# Patient Record
Sex: MALE | Race: WHITE
[De-identification: names, ages, dates, MRNs, and addresses within clinical notes are randomized per-mention and may not be internally consistent; named-entity substitution may affect disease eponyms.]

---

## 2022-05-28 ENCOUNTER — HOSPITAL ENCOUNTER (EMERGENCY)
Dept: HOSPITAL 12 - ER | Age: 52
Discharge: HOME | End: 2022-05-28
Payer: COMMERCIAL

## 2022-05-28 VITALS — HEIGHT: 68 IN | WEIGHT: 165 LBS | BODY MASS INDEX: 25.01 KG/M2

## 2022-05-28 VITALS — SYSTOLIC BLOOD PRESSURE: 124 MMHG | DIASTOLIC BLOOD PRESSURE: 80 MMHG

## 2022-05-28 DIAGNOSIS — J45.909: ICD-10-CM

## 2022-05-28 DIAGNOSIS — R55: ICD-10-CM

## 2022-05-28 DIAGNOSIS — E03.9: ICD-10-CM

## 2022-05-28 DIAGNOSIS — E86.0: Primary | ICD-10-CM

## 2022-05-28 LAB
ALP SERPL-CCNC: 44 U/L (ref 50–136)
ALT SERPL W/O P-5'-P-CCNC: 38 U/L (ref 16–63)
AST SERPL-CCNC: 18 U/L (ref 15–37)
BILIRUB SERPL-MCNC: 1.1 MG/DL (ref 0.2–1)
BUN SERPL-MCNC: 25 MG/DL (ref 7–18)
CHLORIDE SERPL-SCNC: 101 MMOL/L (ref 98–107)
CO2 SERPL-SCNC: 33 MMOL/L (ref 21–32)
CREAT SERPL-MCNC: 1.3 MG/DL (ref 0.6–1.3)
GLUCOSE SERPL-MCNC: 87 MG/DL (ref 74–106)
HCT VFR BLD AUTO: 42.8 % (ref 36.7–47.1)
MCH RBC QN AUTO: 31.1 UUG (ref 23.8–33.4)
MCV RBC AUTO: 88 FL (ref 73–96.2)
PLATELET # BLD AUTO: 218 K/UL (ref 152–348)
POTASSIUM SERPL-SCNC: 3.9 MMOL/L (ref 3.5–5.1)
WS STN SPEC: 7 G/DL (ref 6.4–8.2)

## 2022-05-28 PROCEDURE — A4663 DIALYSIS BLOOD PRESSURE CUFF: HCPCS

## 2022-05-28 NOTE — NUR
Patient discharged to home in stable condition.  Written and verbal after care 
instructions given. 

Patient verbalizes understanding of instructions. Stressed follow up or return 
to ER for worsening s/s.

Steady gait, denies any pain/discomfort upon discharge. No changes in LOC. No 
n/v or HA. Accompanied by significant other.